# Patient Record
Sex: MALE | Race: WHITE | NOT HISPANIC OR LATINO
[De-identification: names, ages, dates, MRNs, and addresses within clinical notes are randomized per-mention and may not be internally consistent; named-entity substitution may affect disease eponyms.]

---

## 2023-01-04 ENCOUNTER — APPOINTMENT (OUTPATIENT)
Dept: PODIATRY | Facility: CLINIC | Age: 68
End: 2023-01-04
Payer: COMMERCIAL

## 2023-01-04 DIAGNOSIS — M79.674 PAIN IN RIGHT TOE(S): ICD-10-CM

## 2023-01-04 DIAGNOSIS — M79.671 PAIN IN RIGHT FOOT: ICD-10-CM

## 2023-01-04 DIAGNOSIS — M10.9 GOUT, UNSPECIFIED: ICD-10-CM

## 2023-01-04 PROCEDURE — 99203 OFFICE O/P NEW LOW 30 MIN: CPT | Mod: 25

## 2023-01-04 PROCEDURE — 73630 X-RAY EXAM OF FOOT: CPT | Mod: RT

## 2023-01-04 RX ORDER — COLCHICINE 0.6 MG/1
0.6 TABLET ORAL DAILY
Qty: 5 | Refills: 0 | Status: ACTIVE | COMMUNITY
Start: 2023-01-04 | End: 1900-01-01

## 2023-01-05 DIAGNOSIS — S92.301A: ICD-10-CM

## 2023-01-05 DIAGNOSIS — S92.144A: ICD-10-CM

## 2023-01-05 DIAGNOSIS — S92.201A: ICD-10-CM

## 2023-01-06 PROBLEM — M10.9 GOUT OF BIG TOE: Status: ACTIVE | Noted: 2023-01-05

## 2023-01-06 PROBLEM — M79.671 RIGHT FOOT PAIN: Status: ACTIVE | Noted: 2023-01-05

## 2023-01-06 PROBLEM — M79.674 PAIN OF TOE OF RIGHT FOOT: Status: ACTIVE | Noted: 2023-01-05

## 2023-01-06 NOTE — HISTORY OF PRESENT ILLNESS
[FreeTextEntry1] : Patient presents today because of pain at his big toe in the right foot.  He has a history of gout. He denies any trauma. He states that he had been infected in the past and at first they put him on an antibiotic and he presents today stating that it was recently painful and now it has gotten better.

## 2023-01-06 NOTE — PHYSICAL EXAM
[2+] : left foot dorsalis pedis 2+ [Sensation] : the sensory exam was normal to light touch and pinprick [No Focal Deficits] : no focal deficits [Deep Tendon Reflexes (DTR)] : deep tendon reflexes were 2+ and symmetric [Motor Exam] : the motor exam was normal [Delayed in the Right Toes] : capillary refills normal in right toes [Delayed in the Left Toes] : capillary refills normal in the left toes [FreeTextEntry3] : Hair growth noted on digits. Proximal to distal cooling is within normal limits.  [de-identified] : He has an arthritic hammered hallux and I think he had a subacute case of gout that sort of calmed down and resolved on its own. He has slight pain at the 1st MPJ medially but no erythema or swelling. 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

## 2023-01-06 NOTE — PROCEDURE
[FreeTextEntry1] : X-rays were taken due to pain to R/O osteoarthritis.\par \par X-ray Report: (Right foot - 3 views) I got x-rays that demonstrate a hammered hallux. No sign of osteoporosis. Minimal arthrosis appreciated.

## 2023-01-06 NOTE — ASSESSMENT
[FreeTextEntry1] : \par Impression: Sub acute case of gout. Pain.\par \par Treatment: I gave him a gout diet to follow strictly. I gave him a tube foam protector. I discussed more appropriate shoes that don't rub on that area and are more cushioned. I also wrote for [place] in case he is away somewhere and he gets a flare-up of gout. He will follow the gout diet strictly for the next week. I will see him back in the office for evaluation as needed.

## 2023-04-27 ENCOUNTER — APPOINTMENT (OUTPATIENT)
Dept: ORTHOPEDIC SURGERY | Facility: CLINIC | Age: 68
End: 2023-04-27
Payer: COMMERCIAL

## 2023-04-27 VITALS — HEIGHT: 72 IN | WEIGHT: 163 LBS | BODY MASS INDEX: 22.08 KG/M2

## 2023-04-27 DIAGNOSIS — M25.511 PAIN IN RIGHT SHOULDER: ICD-10-CM

## 2023-04-27 PROCEDURE — 99203 OFFICE O/P NEW LOW 30 MIN: CPT

## 2023-04-27 PROCEDURE — 73030 X-RAY EXAM OF SHOULDER: CPT | Mod: RT

## 2023-04-28 PROBLEM — M25.511 RIGHT SHOULDER PAIN: Status: ACTIVE | Noted: 2023-04-28

## 2023-04-28 NOTE — ADDENDUM
[FreeTextEntry1] : This note was written by Liya Mckee on 04/27/2023 acting solely as a scribe for Dr. Dylan Rodríguez.\par \par All medical record entries made by the Scribe were at my, Dr. Dylan Rodríguez, direction and personally dictated by me on 04/27/2023. I have personally reviewed the chart and agree that the record accurately reflects my personal performance of the history, physical exam, assessment and plan.

## 2023-04-28 NOTE — DISCUSSION/SUMMARY
[de-identified] : 66 y/o male with right shoulder pain. \par \par Patient presents with acute condition to the right shoulder, and is status post left shoulder rotator cuff injury and long head biceps tendon rupture.  Symptoms on the right shoulder are consistent with rotator cuff tendon dysfunction (including inflammatory tendonitis vs. internal structural damage), subdeltoid/subacromial bursitis, or impingement of the rotator cuff at the acromion. Other contributing sources of pain may also be the acromioclavicular joint or long head of the biceps tendon. Condition is typically caused by overhead repetitive activity or as a result of minor injury. Various minor injuries recently, with some mild rotator cuff dysfunction. Symptoms are Mild on today's examination, except for ecchymosis through the medial arm which may be consistent with minor injury with increased hematoma formation due to anticoagulation use.\par \par Discussed short-term and long-term outcomes as well as the goal of treatment to reduce pain and restore function. Nonsurgical treatment is typically first-line therapy that may take weeks to months to resolve symptoms; includes rest from overhead activities, NSAIDs, home exercise program versus physical therapy to restore normal strength/ROM/function of the shoulder, and possible corticosteroid injection. Also discussed role of arthroscopic surgical intervention when nonsurgical treatment does not adequately relieve pain/inflammation.\par \par Recommendations: Conservative modalities as above (overhead activity rest/avoidance, ice, NSAIDs, strengthening and stretching program). PT prescription provided\par \par Follow-up 6 to 8 weeks if no improvement for possible MRI imaging.

## 2023-04-28 NOTE — PHYSICAL EXAM
[de-identified] : Oriented to time, place, person\par Mood: Normal\par Affect: Normal\par Appearance: Healthy, well appearing, no acute distress.\par Gait: Normal\par Assistive Devices: None\par \par Right shoulder exam:\par \par Inspection: No malalignment, No defects, No atrophy, moderate ecchymosis to the medial arm. \par Skin: No masses, No lesions\par Neck: Negative Spurling, full ROM, no pain with ROM\par AROM: FF to 180, abduction to 90, ER to 80, IR to upper lumbar\par Painful arc ROM: none\par Tenderness: No bicipital tenderness, no tenderness to greater tuberosity/RTC insertion, mild anterior shoulder/lesser tuberosity tenderness\par Strength: 5/5 ER, 5/5 IR in adduction, 5/5 supraspinatus testing, negative Hamblen's test\par AC joint: No TTP/pain with cross arm testing\par Biceps: Speed Negative, Yergason Negative \par Impingement test: Mild Holliday, mild Neer\par Vasc: 2+ radial pulse \par Stability: Stable \par Neuro: AIN, PIN, Ulnar nerve intact to motor\par Sensation: Intact to light touch throughout  [de-identified] : The following radiographs were ordered and read by me during this patients visit. I reviewed each radiograph in detail with the patient and discussed the findings as highlighted below.\par \par 3 views of right shoulder were obtained today, 04/27/2023, that show no acute fracture or dislocation. There is no glenohumeral and no AC joint degenerative change seen. Type II acromion. There is no significant malalignment. No significant other obvious osseous abnormality, otherwise unremarkable.

## 2023-04-28 NOTE — HISTORY OF PRESENT ILLNESS
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 Patient plays handball and thinks pain is from overuse. The pain is brought on with FF and is taking Naproxen PRN.  He noticed bruising in the arm this past Tuesday Denies prior shoulder sx, numbness or tingling. S/p left shoulder RTC repair and biceps tenotomy x 1 year. \par \par The patient's past medical history, past surgical history, medications and allergies were reviewed by me today with the patient and documented accordingly. In addition, the patient's family and social history, which were noncontributory to this visit, were reviewed also.

## 2024-05-31 ENCOUNTER — APPOINTMENT (OUTPATIENT)
Dept: PODIATRY | Facility: CLINIC | Age: 69
End: 2024-05-31
Payer: COMMERCIAL

## 2024-05-31 DIAGNOSIS — M79.673 PAIN IN UNSPECIFIED FOOT: ICD-10-CM

## 2024-05-31 DIAGNOSIS — B35.1 TINEA UNGUIUM: ICD-10-CM

## 2024-05-31 DIAGNOSIS — M21.6X9 OTHER ACQUIRED DEFORMITIES OF UNSPECIFIED FOOT: ICD-10-CM

## 2024-05-31 PROCEDURE — 99212 OFFICE O/P EST SF 10 MIN: CPT

## 2024-06-05 PROBLEM — B35.1 ONYCHOMYCOSIS: Status: ACTIVE | Noted: 2024-06-04

## 2024-06-05 PROBLEM — M21.6X9 OTHER ACQUIRED DEFORMITIES OF UNSPECIFIED FOOT: Status: ACTIVE | Noted: 2024-06-04

## 2024-06-05 PROBLEM — M79.673 PAIN, FOOT: Status: ACTIVE | Noted: 2024-06-04

## 2024-06-05 NOTE — HISTORY OF PRESENT ILLNESS
[FreeTextEntry1] : Patient presents today complaining of tailor's bunion bilateral with tailor's bunion bursitis and keratosis. The patient also has onychomycosis in multiple nails but the hallux and 5th toes are the worst. They are yellow, thick, brittle with subungual debris and mycosis. Patient cannot care for this himself.

## 2024-06-05 NOTE — PHYSICAL EXAM
[2+] : left foot dorsalis pedis 2+ [Sensation] : the sensory exam was normal to light touch and pinprick [No Focal Deficits] : no focal deficits [Deep Tendon Reflexes (DTR)] : deep tendon reflexes were 2+ and symmetric [Motor Exam] : the motor exam was normal [Delayed in the Right Toes] : capillary refills normal in right toes [Delayed in the Left Toes] : capillary refills normal in the left toes [FreeTextEntry3] : Hair growth noted on digits. Proximal to distal cooling is within normal limits.  [de-identified] : Tailor's bunion bilateral. [FreeTextEntry1] : Painful tailor's bunion bursitis with keratosis bilateral. Onychomycotic hallux and 5th toenails bilateral that are yellow, brittle, thick, deformed and painful at the tops and tips.

## 2024-06-05 NOTE — ASSESSMENT
[FreeTextEntry1] : Impression: Gregorio's bunion (M21.6X9). Onychomycosis (B35.1). Pain (M79.673)  Treatment: I manually and mechanically debrided mycotic nails x4 using a small straight nail splitter and rotary oseas. The nails were aggressively debrided and debulked to make them comfortable in shoe gear. Area was prepped and I sharply trimmed and enucleated areas of keratosis. Dispersion padding applied. I discussed appropriate shoe gear. Follow-up as needed.